# Patient Record
Sex: MALE | Race: WHITE | NOT HISPANIC OR LATINO | URBAN - METROPOLITAN AREA
[De-identification: names, ages, dates, MRNs, and addresses within clinical notes are randomized per-mention and may not be internally consistent; named-entity substitution may affect disease eponyms.]

---

## 2017-01-26 ENCOUNTER — COMMUNICATION - HEALTHEAST (OUTPATIENT)
Dept: FAMILY MEDICINE | Facility: CLINIC | Age: 57
End: 2017-01-26

## 2017-01-26 DIAGNOSIS — E11.9 DIABETES MELLITUS (H): ICD-10-CM

## 2017-01-26 DIAGNOSIS — F32.9 MAJOR DEPRESSION, CHRONIC: ICD-10-CM

## 2017-01-26 DIAGNOSIS — I10 HTN (HYPERTENSION): ICD-10-CM

## 2017-01-30 ENCOUNTER — COMMUNICATION - HEALTHEAST (OUTPATIENT)
Dept: FAMILY MEDICINE | Facility: CLINIC | Age: 57
End: 2017-01-30

## 2017-01-30 ENCOUNTER — RECORDS - HEALTHEAST (OUTPATIENT)
Dept: ADMINISTRATIVE | Facility: OTHER | Age: 57
End: 2017-01-30

## 2017-02-10 ENCOUNTER — COMMUNICATION - HEALTHEAST (OUTPATIENT)
Dept: FAMILY MEDICINE | Facility: CLINIC | Age: 57
End: 2017-02-10

## 2017-02-24 ENCOUNTER — COMMUNICATION - HEALTHEAST (OUTPATIENT)
Dept: FAMILY MEDICINE | Facility: CLINIC | Age: 57
End: 2017-02-24

## 2017-02-24 DIAGNOSIS — E11.9 DIABETES MELLITUS (H): ICD-10-CM

## 2017-02-24 DIAGNOSIS — I10 HTN (HYPERTENSION): ICD-10-CM

## 2017-02-24 DIAGNOSIS — F32.9 MAJOR DEPRESSION, CHRONIC: ICD-10-CM

## 2017-02-24 DIAGNOSIS — R52 PAIN: ICD-10-CM

## 2017-03-27 ENCOUNTER — COMMUNICATION - HEALTHEAST (OUTPATIENT)
Dept: FAMILY MEDICINE | Facility: CLINIC | Age: 57
End: 2017-03-27

## 2017-03-28 ENCOUNTER — COMMUNICATION - HEALTHEAST (OUTPATIENT)
Dept: FAMILY MEDICINE | Facility: CLINIC | Age: 57
End: 2017-03-28

## 2017-03-28 ENCOUNTER — COMMUNICATION - HEALTHEAST (OUTPATIENT)
Dept: SCHEDULING | Facility: CLINIC | Age: 57
End: 2017-03-28

## 2017-03-28 ENCOUNTER — OFFICE VISIT - HEALTHEAST (OUTPATIENT)
Dept: FAMILY MEDICINE | Facility: CLINIC | Age: 57
End: 2017-03-28

## 2017-03-28 DIAGNOSIS — F32.9 MAJOR DEPRESSION, CHRONIC: ICD-10-CM

## 2017-03-28 DIAGNOSIS — M19.90 ARTHRITIS: ICD-10-CM

## 2017-03-28 DIAGNOSIS — I10 HTN (HYPERTENSION): ICD-10-CM

## 2017-03-28 DIAGNOSIS — K21.9 GERD (GASTROESOPHAGEAL REFLUX DISEASE): ICD-10-CM

## 2017-03-28 DIAGNOSIS — R52 PAIN: ICD-10-CM

## 2017-03-28 LAB
CHOLEST SERPL-MCNC: 410 MG/DL
FASTING STATUS PATIENT QL REPORTED: YES
HBA1C MFR BLD: 12.9 % (ref 3.5–6)
HDLC SERPL-MCNC: ABNORMAL MG/DL
LDLC SERPL CALC-MCNC: ABNORMAL MG/DL
LDLC SERPL CALC-MCNC: NORMAL MG/DL
TRIGL SERPL-MCNC: 1512 MG/DL

## 2017-03-28 ASSESSMENT — MIFFLIN-ST. JEOR: SCORE: 1890.89

## 2017-03-29 ENCOUNTER — COMMUNICATION - HEALTHEAST (OUTPATIENT)
Dept: FAMILY MEDICINE | Facility: CLINIC | Age: 57
End: 2017-03-29

## 2017-03-29 DIAGNOSIS — E78.5 HYPERLIPIDEMIA: ICD-10-CM

## 2017-04-21 ENCOUNTER — COMMUNICATION - HEALTHEAST (OUTPATIENT)
Dept: FAMILY MEDICINE | Facility: CLINIC | Age: 57
End: 2017-04-21

## 2017-04-21 DIAGNOSIS — F32.9 MAJOR DEPRESSION, CHRONIC: ICD-10-CM

## 2017-05-12 ENCOUNTER — COMMUNICATION - HEALTHEAST (OUTPATIENT)
Dept: FAMILY MEDICINE | Facility: CLINIC | Age: 57
End: 2017-05-12

## 2017-05-19 ENCOUNTER — COMMUNICATION - HEALTHEAST (OUTPATIENT)
Dept: FAMILY MEDICINE | Facility: CLINIC | Age: 57
End: 2017-05-19

## 2017-05-19 ENCOUNTER — RECORDS - HEALTHEAST (OUTPATIENT)
Dept: ADMINISTRATIVE | Facility: OTHER | Age: 57
End: 2017-05-19

## 2017-05-19 DIAGNOSIS — R52 PAIN: ICD-10-CM

## 2017-05-23 RX ORDER — GABAPENTIN 300 MG/1
CAPSULE ORAL
Qty: 90 CAPSULE | Refills: 8 | Status: SHIPPED | OUTPATIENT
Start: 2017-05-23

## 2017-06-09 ENCOUNTER — COMMUNICATION - HEALTHEAST (OUTPATIENT)
Dept: FAMILY MEDICINE | Facility: CLINIC | Age: 57
End: 2017-06-09

## 2017-06-22 ENCOUNTER — COMMUNICATION - HEALTHEAST (OUTPATIENT)
Dept: FAMILY MEDICINE | Facility: CLINIC | Age: 57
End: 2017-06-22

## 2017-06-23 ENCOUNTER — COMMUNICATION - HEALTHEAST (OUTPATIENT)
Dept: FAMILY MEDICINE | Facility: CLINIC | Age: 57
End: 2017-06-23

## 2017-06-24 ENCOUNTER — COMMUNICATION - HEALTHEAST (OUTPATIENT)
Dept: SCHEDULING | Facility: CLINIC | Age: 57
End: 2017-06-24

## 2017-06-26 ENCOUNTER — COMMUNICATION - HEALTHEAST (OUTPATIENT)
Dept: FAMILY MEDICINE | Facility: CLINIC | Age: 57
End: 2017-06-26

## 2017-06-26 ENCOUNTER — AMBULATORY - HEALTHEAST (OUTPATIENT)
Dept: FAMILY MEDICINE | Facility: CLINIC | Age: 57
End: 2017-06-26

## 2017-06-26 DIAGNOSIS — E11.9 DIABETES MELLITUS TYPE 2, CONTROLLED (H): ICD-10-CM

## 2017-06-26 DIAGNOSIS — E11.9 DIABETES MELLITUS (H): ICD-10-CM

## 2017-06-26 RX ORDER — GLUCOSAMINE HCL/CHONDROITIN SU 500-400 MG
1 CAPSULE ORAL PRN
Qty: 100 EACH | Refills: 11 | Status: SHIPPED | OUTPATIENT
Start: 2017-06-26

## 2017-06-28 ENCOUNTER — COMMUNICATION - HEALTHEAST (OUTPATIENT)
Dept: FAMILY MEDICINE | Facility: CLINIC | Age: 57
End: 2017-06-28

## 2017-06-28 DIAGNOSIS — F32.9 MAJOR DEPRESSION, CHRONIC: ICD-10-CM

## 2017-07-01 ENCOUNTER — COMMUNICATION - HEALTHEAST (OUTPATIENT)
Dept: FAMILY MEDICINE | Facility: CLINIC | Age: 57
End: 2017-07-01

## 2017-07-01 DIAGNOSIS — I10 HTN (HYPERTENSION): ICD-10-CM

## 2017-07-06 ENCOUNTER — COMMUNICATION - HEALTHEAST (OUTPATIENT)
Dept: FAMILY MEDICINE | Facility: CLINIC | Age: 57
End: 2017-07-06

## 2017-07-10 ENCOUNTER — COMMUNICATION - HEALTHEAST (OUTPATIENT)
Dept: FAMILY MEDICINE | Facility: CLINIC | Age: 57
End: 2017-07-10

## 2017-07-12 ENCOUNTER — AMBULATORY - HEALTHEAST (OUTPATIENT)
Dept: FAMILY MEDICINE | Facility: CLINIC | Age: 57
End: 2017-07-12

## 2017-07-12 DIAGNOSIS — E11.9 DIABETES MELLITUS (H): ICD-10-CM

## 2017-07-12 RX ORDER — INSULIN GLARGINE 100 [IU]/ML
INJECTION, SOLUTION SUBCUTANEOUS
Qty: 3 PEN | Status: SHIPPED | OUTPATIENT
Start: 2017-07-12

## 2017-08-28 ENCOUNTER — COMMUNICATION - HEALTHEAST (OUTPATIENT)
Dept: FAMILY MEDICINE | Facility: CLINIC | Age: 57
End: 2017-08-28

## 2017-08-28 DIAGNOSIS — M19.90 ARTHRITIS: ICD-10-CM

## 2017-08-28 DIAGNOSIS — E11.9 DIABETES MELLITUS (H): ICD-10-CM

## 2017-09-09 ENCOUNTER — COMMUNICATION - HEALTHEAST (OUTPATIENT)
Dept: FAMILY MEDICINE | Facility: CLINIC | Age: 57
End: 2017-09-09

## 2017-09-09 DIAGNOSIS — K21.9 GERD (GASTROESOPHAGEAL REFLUX DISEASE): ICD-10-CM

## 2017-11-16 ENCOUNTER — COMMUNICATION - HEALTHEAST (OUTPATIENT)
Dept: FAMILY MEDICINE | Facility: CLINIC | Age: 57
End: 2017-11-16

## 2017-11-16 DIAGNOSIS — M19.90 ARTHRITIS: ICD-10-CM

## 2017-11-16 DIAGNOSIS — E11.9 DIABETES MELLITUS (H): ICD-10-CM

## 2017-11-18 RX ORDER — IBUPROFEN 800 MG/1
TABLET, FILM COATED ORAL
Qty: 30 TABLET | Refills: 0 | Status: SHIPPED | OUTPATIENT
Start: 2017-11-18

## 2017-12-18 ENCOUNTER — COMMUNICATION - HEALTHEAST (OUTPATIENT)
Dept: FAMILY MEDICINE | Facility: CLINIC | Age: 57
End: 2017-12-18

## 2017-12-18 DIAGNOSIS — M19.90 ARTHRITIS: ICD-10-CM

## 2017-12-18 DIAGNOSIS — K21.9 GERD (GASTROESOPHAGEAL REFLUX DISEASE): ICD-10-CM

## 2017-12-18 DIAGNOSIS — F32.9 MAJOR DEPRESSION, CHRONIC: ICD-10-CM

## 2017-12-18 DIAGNOSIS — R52 PAIN: ICD-10-CM

## 2017-12-18 DIAGNOSIS — E11.9 DIABETES MELLITUS (H): ICD-10-CM

## 2017-12-18 DIAGNOSIS — I10 HTN (HYPERTENSION): ICD-10-CM

## 2017-12-19 ENCOUNTER — COMMUNICATION - HEALTHEAST (OUTPATIENT)
Dept: FAMILY MEDICINE | Facility: CLINIC | Age: 57
End: 2017-12-19

## 2017-12-19 RX ORDER — LISINOPRIL 20 MG/1
TABLET ORAL
Qty: 30 TABLET | Refills: 1 | Status: SHIPPED | OUTPATIENT
Start: 2017-12-19

## 2017-12-19 RX ORDER — GLIPIZIDE 5 MG/1
10 TABLET ORAL DAILY
Qty: 60 TABLET | Refills: 1 | Status: SHIPPED | OUTPATIENT
Start: 2017-12-19

## 2017-12-19 RX ORDER — IBUPROFEN 800 MG/1
TABLET, FILM COATED ORAL
Qty: 30 TABLET | Refills: 0 | Status: SHIPPED | OUTPATIENT
Start: 2017-12-19

## 2018-01-12 ENCOUNTER — RECORDS - HEALTHEAST (OUTPATIENT)
Dept: LAB | Facility: CLINIC | Age: 58
End: 2018-01-12

## 2018-01-12 LAB
ALBUMIN SERPL-MCNC: 3.7 G/DL (ref 3.5–5)
ALP SERPL-CCNC: 162 U/L (ref 45–120)
ALT SERPL W P-5'-P-CCNC: 10 U/L (ref 0–45)
ANION GAP SERPL CALCULATED.3IONS-SCNC: 16 MMOL/L (ref 5–18)
AST SERPL W P-5'-P-CCNC: 9 U/L (ref 0–40)
BILIRUB SERPL-MCNC: 0.3 MG/DL (ref 0–1)
BUN SERPL-MCNC: 17 MG/DL (ref 8–22)
CALCIUM SERPL-MCNC: 9.3 MG/DL (ref 8.5–10.5)
CHLORIDE BLD-SCNC: 103 MMOL/L (ref 98–107)
CHOLEST SERPL-MCNC: 191 MG/DL
CO2 SERPL-SCNC: 23 MMOL/L (ref 22–31)
CREAT SERPL-MCNC: 0.96 MG/DL (ref 0.7–1.3)
FASTING STATUS PATIENT QL REPORTED: NO
GFR SERPL CREATININE-BSD FRML MDRD: >60 ML/MIN/1.73M2
GLUCOSE BLD-MCNC: 312 MG/DL (ref 70–125)
HDLC SERPL-MCNC: 30 MG/DL
LDLC SERPL CALC-MCNC: 92 MG/DL
POTASSIUM BLD-SCNC: 3.9 MMOL/L (ref 3.5–5)
PROT SERPL-MCNC: 7.3 G/DL (ref 6–8)
PSA SERPL-MCNC: 0.6 NG/ML (ref 0–3.5)
SODIUM SERPL-SCNC: 142 MMOL/L (ref 136–145)
T4 FREE SERPL-MCNC: 1 NG/DL (ref 0.7–1.8)
TRIGL SERPL-MCNC: 347 MG/DL
TSH SERPL DL<=0.005 MIU/L-ACNC: 1.09 UIU/ML (ref 0.3–5)

## 2018-01-13 LAB
CREAT UR-MCNC: 29.4 MG/DL
MICROALBUMIN UR-MCNC: 69.16 MG/DL (ref 0–1.99)
MICROALBUMIN/CREAT UR: 2352.4 MG/G

## 2021-05-30 VITALS — HEIGHT: 69 IN | BODY MASS INDEX: 35.44 KG/M2 | WEIGHT: 239.31 LBS

## 2021-06-09 NOTE — PROGRESS NOTES
SUBJECTIVE   Rishabh rGier is a 56 y.o. old male with a past medical history of HTN, DM, HLD and obesity who presented to clinic today for further evaluation of diabetes check and medication refill. He was last seen in 2015.     He needs many medication refills today. He would like a prescription of ibuprofen and omeprazole which he has been obtaining from [facility] but these are expensive and would like to see if prescriptions are cheaper. He takes ibuprofen 200x 4 tablets a day and this works for his arthritis. He denies any bleeding or bruising or any side effects beside stomach upset. He does take omeprazole 20 mg daily for the stomach upset.     He denies fever/chills, ear nose throat problem, N/V, abdominal pain, chest pain with exertion. He does report mild SOB with going up and down the ladder at work but this doesn't prevent him from doing physical activities. 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 He denies stool changes, blood in stool. He does urinate a lot every 4 hours and does wake up at least 3 times a night to urinate and he thinks it's because of his diabetes. He denies weight loss, night sweat. He's out of his medications for 2 days. He's not on any metformin because it gave him bad diarrhea even with the lowest dose. His neuropathic pain/tingling is ok and he thinks that the gabapentin at the current dose works well.    He does still smoke 1ppd and isn't interested in smoking cessation. His family members have had COPD but this hasn't stopped him.     His depression has been ok, despite a sister having passed away recently for emphysema. He's coping ok with her death. He denies suicidal ideation.      Medical History  Active Ambulatory (Non-Hospital) Problems    Diagnosis     Vitamin D deficiency     Hypertriglyceridemia     HTN (hypertension)     Diabetes mellitus type 2, controlled     Nicotine abuse     Obesity     CHEPE on CPAP     Major depression, chronic     Past Medical History:  "  Diagnosis Date     DM2 (diabetes mellitus, type 2)      HTN (hypertension)        Surgical History  He  has a past surgical history that includes Scrotal surgery (2013).    Social History  Reviewed, and he  reports that he has been smoking Cigarettes.  He does not have any smokeless tobacco history on file. He reports that he does not drink alcohol or use illicit drugs.    Family History  Reviewed, and family history includes Anxiety disorder in his sister, sister, sister, sister, and sister; COPD in his sister and sister; Dementia in his mother; Heart disease in his father and paternal grandfather.    Medications  Reviewed and reconciled    Allergies  Allergies   Allergen Reactions     Meropenem Rash         OBJECTIVE  Physical Exam:  Vital signs: /84  Pulse 80  Resp 16  Ht 5' 9\" (1.753 m)  Wt (!) 239 lb 5 oz (108.6 kg)  BMI 35.34 kg/m2  Weight: (!) 239 lb 5 oz (108.6 kg)    General appearance: pleasant, appears stated age, cooperative and in no distress, obese  Eyes: EOMs intact, PERRL, conjunctivae normal.   ENT: moist oral mucosa, posterior oropharynx normal.  Lymph: no cervical/supraclavicular adenopathy  Respiratory: clear to auscultation bilaterally, good air movement throughout, no wheezing or crackles, speaking full sentences without difficulty  Cardiovascular: regular rate and rhythm, no murmur appreciated, no leg edema  Abdomen: active bowel sounds, soft, non-tender, non-distended  Skin: no rashes  Neuro: alert oriented x 3, grossly normal otherwise, diabetic foot exam performed, normal.   Psych: normal affect, appropriate conversation     ASSESSMENT/ PLAN    1. Diabetes type 2, uncontrolled  Uncontrolled. A1C today 12.9. A1C 8.1 in 2015. Symptomatic with increased urination. Fasting glucose in the upper 200's per patient. Cannot take metformin due to side effects. Patient unwilling to see diabetes educator. Will have patient start Januvia and long-acting insulin 10 units at night. Stop " glipizide. Educated patient on goal fasting glucose, when to check BG (once daily on long acting insulin and when he doesn't feel well). Returned in 3 weeks to follow up with his PCP. He verbalized understanding to let us know if blood sugar above or below goal in order to titrate insulin.   - Glycosylated Hemoglobin A1c  - Microalbumin, Random Urine  - Lipid Cascade  - Renal Function Profile  - LANTUS SOLOSTAR 100 unit/mL (3 mL) pen; Inject 10 Units under the skin at bedtime. 11.65 Type 2 with hyperglycemia  Dispense: 3 mL; Refill: PRN  - sitaGLIPtin (JANUVIA) 100 MG tablet; Take 1 tablet (100 mg total) by mouth daily. With or without food  Dispense: 90 tablet; Refill: 0  - Custom LDL Cholesterol, Direct    2. HTN (hypertension)  Poorly controlled. 168/84 today. No symptoms. He doesn't want to check BP at home. Hasn't taken antihypertensive for the last 2 days. Will refill at previous dose. RTC in 3 weeks for dose adjustment.   - lisinopril (PRINIVIL,ZESTRIL) 20 MG tablet; TAKE ONE TABLET BY MOUTH DAILY  Dispense: 90 tablet; Refill: 0    3. Pain  Neuropathic. Well controlled per patient. Will continue current dose.   - gabapentin (NEURONTIN) 300 MG capsule; TAKE 1 CAPSULE (300 MG) BY MOUTH 3 TIMES A DAY.  Dispense: 90 capsule; Refill: 0    4. Major depression, chronic  Well controlled.   - FLUoxetine (PROZAC) 20 MG capsule; TAKE 1 CAPSULE BY MOUTH DAILY  Dispense: 30 capsule; Refill: 0    5. Arthritis  - ibuprofen (ADVIL,MOTRIN) 800 MG tablet; Take 1 tablet (800 mg total) by mouth daily as needed for pain.  Dispense: 60 tablet; Refill: 2    6. GERD (gastroesophageal reflux disease)  Omeprazole has been helping. Will try Zantac.   - ranitidine (ZANTAC) 150 MG tablet; Take 1 tablet (150 mg total) by mouth 2 (two) times a day.  Dispense: 60 tablet; Refill: 1      Saadia Henderson MD

## 2021-06-09 NOTE — PROGRESS NOTES
Please call patient:    A lot of protein in urine - due to uncontrolled diabetes.   High cholesterol - I recommend cholesterol lowering medication. Side effects include muscle ache most commonly. If he's ok with this I will send script to pharmacy  Please remember to come back in 3 weeks for follow up with Dr. Potter for diabetes follow up.    Dr. Henderson

## 2021-06-15 PROBLEM — E11.319 DIABETIC RETINOPATHY ASSOCIATED WITH TYPE 2 DIABETES MELLITUS (H): Status: ACTIVE | Noted: 2017-03-28

## 2021-06-15 PROBLEM — E11.9 DIABETES MELLITUS (H): Status: ACTIVE | Noted: 2017-03-28
